# Patient Record
(demographics unavailable — no encounter records)

---

## 2024-10-24 NOTE — PHYSICAL EXAM
[Chaperone Present] : A chaperone was present in the examining room during all aspects of the physical examination [73445] : A chaperone was present during the pelvic exam. [Appropriately responsive] : appropriately responsive [Alert] : alert [No Acute Distress] : no acute distress [No Lymphadenopathy] : no lymphadenopathy [Soft] : soft [Non-tender] : non-tender [Non-distended] : non-distended [Oriented x3] : oriented x3 [Examination Of The Breasts] : a normal appearance [No Discharge] : no discharge [No Masses] : no breast masses were palpable [Labia Majora] : normal [Labia Minora] : normal [No Bleeding] : There was no active vaginal bleeding [IUD String] : an IUD string was noted [Normal] : normal [Uterine Adnexae] : non-palpable [FreeTextEntry2] : ashlee

## 2024-10-24 NOTE — HISTORY OF PRESENT ILLNESS
[No] : Patient does not have concerns regarding sex [Currently Active] : currently active [Patient refuses STI testing] : Patient refuses STI testing [FreeTextEntry1] : Mery is a  LMP 24 who presents for annual exam. She is without complaints. Denies pelvic pain, abnormal bleeding, or vaginal discharge. Denies issues with bowel or bladder function.  She is sexually active with male partner (s). She is using BS for contraception. Denies pain with intercourse. She is sexually satisfied.  Lives with alone. Works as . Feels safe at home. Denies depression/abuse.  Immunizations: did not receive gardasil She had Mirena IUD placed in 2024. Was getting spotting and had vaginitis symptoms. Considering IUD removal. She states she was happier on the Kyleena than she is using the Mirena

## 2024-10-24 NOTE — PHYSICAL EXAM
[Chaperone Present] : A chaperone was present in the examining room during all aspects of the physical examination [[id number]] : A chaperone was present during the pelvic exam. [Appropriately responsive] : appropriately responsive [Alert] : alert [No Acute Distress] : no acute distress [No Lymphadenopathy] : no lymphadenopathy [Soft] : soft [Non-tender] : non-tender [Non-distended] : non-distended [Oriented x3] : oriented x3 [Examination Of The Breasts] : a normal appearance [No Discharge] : no discharge [No Masses] : no breast masses were palpable [Labia Majora] : normal [Labia Minora] : normal [No Bleeding] : There was no active vaginal bleeding [IUD String] : an IUD string was noted [Normal] : normal [Uterine Adnexae] : non-palpable [FreeTextEntry2] : ashlee

## 2024-10-24 NOTE — DISCUSSION/SUMMARY
[FreeTextEntry1] :   The benefits of adequate calcium intake and a daily multivitamin along with routine daily cardiovascular exercise were reviewed with the patient. RTO for sonogram to confirm IUD placement due to pelvic pain.    The importance of safer-sex was discussed with the patient. We reviewed ASCCP/ACOG guidelines for pap smears.

## 2024-10-30 NOTE — DISCUSSION/SUMMARY
[FreeTextEntry1] : We reviewed the sonogram which showed IUD in good position, otherwise normal findings. She will continue the IUD for now.  First dose of HPV vaccine given today. RTO in 8 weeks for administration of second dose.

## 2024-10-30 NOTE — HISTORY OF PRESENT ILLNESS
[FreeTextEntry1] : Mery presents for IUD check as she has been having some pelvic discomfort and BTB. She had her Kyleena removed and Mirena replaced during sterilization procedure in Jan 2024. She is only using the Mirena for bleeding control as prior to the Kyleena she had heavy menses and dysmenorrhea.  Sonogram today shows IUD in good position.  Over the past 6 weeks, she states that bleeding and pelvic pain has resolved. Originally she was planning to have IUD removed today but due to improvement in symptoms and reassuring sonogram she would like to keep it in place. At annual visit we discussed HPV vaccination, and she is requesting first dose today.

## 2024-11-08 NOTE — PLAN
[FreeTextEntry1] : 37F CPE normal examination and vital signs  pt up to date with pap  PHQ-9 negative  will check routine labs today  continue all medications as directed  RTO as routine for follow up.

## 2024-11-08 NOTE — HEALTH RISK ASSESSMENT
[Excellent] : ~his/her~  mood as  excellent [FreeTextEntry1] : none [No] : In the past 12 months have you used drugs other than those required for medical reasons? No [0] : 2) Feeling down, depressed, or hopeless: Not at all (0) [PHQ-2 Negative - No further assessment needed] : PHQ-2 Negative - No further assessment needed [de-identified] : GYN, pscyh  [de-identified] : cardio  [[id number]] : 0 [Never] : Never [Patient reported PAP Smear was normal] : Patient reported PAP Smear was normal [Change in mental status noted] : No change in mental status noted [None] : None [Employed] : employed [Significant Other] : lives with significant other [Feels Safe at Home] : Feels safe at home [Fully functional (bathing, dressing, toileting, transferring, walking, feeding)] : Fully functional (bathing, dressing, toileting, transferring, walking, feeding) [Fully functional (using the telephone, shopping, preparing meals, housekeeping, doing laundry, using] : Fully functional and needs no help or supervision to perform IADLs (using the telephone, shopping, preparing meals, housekeeping, doing laundry, using transportation, managing medications and managing finances) [Reports changes in hearing] : Reports no changes in hearing [Reports changes in vision] : Reports no changes in vision [Reports changes in dental health] : Reports no changes in dental health

## 2024-11-08 NOTE — COUNSELING
[Behavioral health counseling provided] : Behavioral health counseling provided [Engage in a relaxing activity] : Engage in a relaxing activity [Plan in advance] : Plan in advance [Encouraged to increase physical activity] : Encouraged to increase physical activity

## 2024-11-08 NOTE — HISTORY OF PRESENT ILLNESS
[FreeTextEntry1] : patient presents for physical  [de-identified] : 37F is here for physical examination. No acute medical concerns. Denies depression, anxiety, palpitations, headaches.

## 2024-11-08 NOTE — PHYSICAL EXAM
[No Acute Distress] : no acute distress [Normal Sclera/Conjunctiva] : normal sclera/conjunctiva [PERRL] : pupils equal round and reactive to light [Normal Outer Ear/Nose] : the outer ears and nose were normal in appearance [Normal Oropharynx] : the oropharynx was normal [Normal TMs] : both tympanic membranes were normal [No JVD] : no jugular venous distention [No Lymphadenopathy] : no lymphadenopathy [Supple] : supple [Thyroid Normal, No Nodules] : the thyroid was normal and there were no nodules present [No Respiratory Distress] : no respiratory distress  [No Accessory Muscle Use] : no accessory muscle use [Clear to Auscultation] : lungs were clear to auscultation bilaterally [Normal Rate] : normal rate  [Regular Rhythm] : with a regular rhythm [Normal S1, S2] : normal S1 and S2 [No Murmur] : no murmur heard [Pedal Pulses Present] : the pedal pulses are present [No Extremity Clubbing/Cyanosis] : no extremity clubbing/cyanosis [Soft] : abdomen soft [Non Tender] : non-tender [Non-distended] : non-distended [No HSM] : no HSM [Normal Bowel Sounds] : normal bowel sounds [Normal Posterior Cervical Nodes] : no posterior cervical lymphadenopathy [Normal Anterior Cervical Nodes] : no anterior cervical lymphadenopathy [No Spinal Tenderness] : no spinal tenderness [Grossly Normal Strength/Tone] : grossly normal strength/tone [Normal Gait] : normal gait [Alert and Oriented x3] : oriented to person, place, and time

## 2025-01-15 NOTE — REVIEW OF SYSTEMS
[Fever] : fever [Chills] : chills [Fatigue] : fatigue [Nasal Discharge] : nasal discharge [Postnasal Drip] : postnasal drip [Chest Pain] : no chest pain [Palpitations] : no palpitations [Shortness Of Breath] : no shortness of breath [Wheezing] : no wheezing [Cough] : cough [Dyspnea on Exertion] : dyspnea on exertion

## 2025-01-15 NOTE — HISTORY OF PRESENT ILLNESS
[FreeTextEntry8] : 37 YOF with fevers (ttmax 102.4), chills, body aches, productive cough with yellow phlegm, some shortness of breath. She has been taking Tylenol.

## 2025-01-15 NOTE — ASSESSMENT
[FreeTextEntry1] : Advised symptoms suggest of viral infection most likely flu.  No ABX is warranted. Advised to use pseudoephedrine for sinus congestion/pressure. May use Tylenol/ibuprofen for pain/fevers Symptoms may worsen before they improve. Will send in Tamiflu and albuterol inhaler. Symptoms should resolve in 7- 10 days. Cough may last 2 weeks. Wash hands frequently. Cover cough. If symptoms do not improve after 10 days, then return to office.

## 2025-01-15 NOTE — PHYSICAL EXAM
[No Acute Distress] : no acute distress [Ill-Appearing] : ill-appearing [Wet Cough] : a wet cough was heard [Normal] : normal rate, regular rhythm, normal S1 and S2 and no murmur heard [Normal Affect] : the affect was normal [Alert and Oriented x3] : oriented to person, place, and time [Normal Insight/Judgement] : insight and judgment were intact

## 2025-03-11 NOTE — HISTORY OF PRESENT ILLNESS
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  Currently in a happy relationship. Patient currently under marked stress from work. Seeing increased hair coming out.  Treated with: Continue minoxidil 2.5 mg p.o. at night -patient "forgets" to take this.  On it approximately 2.5 mg p.o. every other day

## 2025-03-11 NOTE — PLAN
[TextEntry] : Patient advised to try taking minoxidil 2.5 mg at night every day when possible  Return 6 months

## 2025-03-11 NOTE — PHYSICAL EXAM
[Alert] : alert [Oriented x 3] : ~L oriented x 3 [Well Nourished] : well nourished [FreeTextEntry3] : Scalp: Moderate thinning-especially on crown 1-4 hairs removed for gentle tug diffusely

## 2025-04-01 NOTE — HISTORY OF PRESENT ILLNESS
[FreeTextEntry1] : Pt presents for BP check and med check [de-identified] : 37F is here for follow up. She has concern with her BP. Denies chest pain, headaches.

## 2025-04-01 NOTE — HISTORY OF PRESENT ILLNESS
3 [FreeTextEntry1] : Pt presents for BP check and med check [de-identified] : 37F is here for follow up. She has concern with her BP. Denies chest pain, headaches.

## 2025-04-01 NOTE — PLAN
[FreeTextEntry1] : advised to monitor BP at home  goal BP < 120/80  f/u with BP in 1 month continue all medications as directed  RTO as routine for follow up.

## 2025-05-23 NOTE — HISTORY OF PRESENT ILLNESS
[FreeTextEntry1] : PT presents today to f/u from chronic migraines since April to now, anxiety, depression, more panic attacks, trouble sleeping, pain in lower R abdominal side, loose stools and check up on BP. [de-identified] : 37F is here due to anxiety/depression along with stress and headaches from new job she started in April. She is taking only propranolol. Denies SI

## 2025-06-20 NOTE — PLAN
[FreeTextEntry1] : acute migraines:  start Rizatriptan as directed  can continue propranolol for migraines and anxiety as well  reviewed labs

## 2025-06-20 NOTE — HISTORY OF PRESENT ILLNESS
Addendum  created 03/21/18 0948 by Dana Rivero CRNA    Anesthesia Event edited       [FreeTextEntry1] : Pt presents today for a f/u.  [de-identified] : 37F is here with continued headaches 8/10 pain. Light and sounds sensitivity. Denies dizziness.

## 2025-06-20 NOTE — REVIEW OF SYSTEMS
[Headache] : headache [Dizziness] : no dizziness [Memory Loss] : no memory loss [Unsteady Walking] : no ataxia [Negative] : Heme/Lymph

## 2025-06-20 NOTE — PHYSICAL EXAM
[Normal Sclera/Conjunctiva] : normal sclera/conjunctiva [PERRL] : pupils equal round and reactive to light [No Respiratory Distress] : no respiratory distress  [No Accessory Muscle Use] : no accessory muscle use [Clear to Auscultation] : lungs were clear to auscultation bilaterally [Normal Rate] : normal rate  [Regular Rhythm] : with a regular rhythm [Normal S1, S2] : normal S1 and S2 [No Extremity Clubbing/Cyanosis] : no extremity clubbing/cyanosis [Normal Gait] : normal gait [Alert and Oriented x3] : oriented to person, place, and time